# Patient Record
Sex: FEMALE | Race: WHITE | NOT HISPANIC OR LATINO | Employment: PART TIME | URBAN - METROPOLITAN AREA
[De-identification: names, ages, dates, MRNs, and addresses within clinical notes are randomized per-mention and may not be internally consistent; named-entity substitution may affect disease eponyms.]

---

## 2017-01-09 ENCOUNTER — OFFICE VISIT (OUTPATIENT)
Dept: NEUROSURGERY | Facility: CLINIC | Age: 72
End: 2017-01-09
Payer: MEDICARE

## 2017-01-09 VITALS
SYSTOLIC BLOOD PRESSURE: 119 MMHG | DIASTOLIC BLOOD PRESSURE: 75 MMHG | HEIGHT: 66 IN | WEIGHT: 192 LBS | HEART RATE: 75 BPM | BODY MASS INDEX: 30.86 KG/M2

## 2017-01-09 DIAGNOSIS — M40.202 KYPHOSIS OF CERVICAL REGION, UNSPECIFIED KYPHOSIS TYPE: Primary | ICD-10-CM

## 2017-01-09 PROCEDURE — 1159F MED LIST DOCD IN RCRD: CPT | Mod: S$GLB,,, | Performed by: NEUROLOGICAL SURGERY

## 2017-01-09 PROCEDURE — 99213 OFFICE O/P EST LOW 20 MIN: CPT | Mod: S$GLB,,, | Performed by: NEUROLOGICAL SURGERY

## 2017-01-09 PROCEDURE — 99999 PR PBB SHADOW E&M-EST. PATIENT-LVL III: CPT | Mod: PBBFAC,,, | Performed by: NEUROLOGICAL SURGERY

## 2017-01-09 PROCEDURE — 1157F ADVNC CARE PLAN IN RCRD: CPT | Mod: S$GLB,,, | Performed by: NEUROLOGICAL SURGERY

## 2017-01-09 PROCEDURE — 1160F RVW MEDS BY RX/DR IN RCRD: CPT | Mod: S$GLB,,, | Performed by: NEUROLOGICAL SURGERY

## 2017-01-09 NOTE — PROGRESS NOTES
This office note has been dictated.  January 9, 2017    Lázaro Vang M.D.  [address], [address]  [place] [address].    RE:  BASSEMIER, CORINNE  [facility] No.:  [id number]    Dear Dr. Vang:    Corinne Bassemier was seen in neurosurgical followup at the office today.  She   has noted for the last couple of months that when she gets up out of bed she has   pain at the base of her neck that radiates into her shoulders and along the   muscles of her neck.  When she is up and around, this dissipates and she is able   to function normally.  She continues to work three days a week.  She has no   pain radiating into the arms.  Her gait difficulties are about the same with leg   stiffness, which is helped by Zanaflex.    On brief examination today, she remains alert.  The cervical kyphosis seems   about the same.  The trapezius and sternocleidomastoid muscles are somewhat   prominent, but not really tender.  There is no tenderness over the brachial   plexus.  The previous cervical incision remains well healed.    We have not had spine films done for several years.  I will have a flexion,   extension and spine series done to see if there has been any change in alignment   or something else that might explain her new symptoms.  I will see her back   with her studies and we can plan from there.  Again, I appreciate working with   you in her care.    Sincerely yours,      SHAHANA  dd: 01/09/2017 12:02:00 (CST)  td: 01/10/2017 04:01:56 (CST)  Doc ID   #[id number]  Job ID #[id number]    CC: Corinne Bassemier

## 2017-01-09 NOTE — MR AVS SNAPSHOT
[facility] - Neurosurgery 7th Fl  [address]  [facility] [address]  Phone: [phone]                  Corinne L Bassemier   2017 11:00 AM   Office Visit    Description:  Female : 1945   Provider:  Mohamud Jordan MD   Department:  [facility] - Neurosurgery 7th Fl           Reason for Visit     Follow-up           Diagnoses this Visit        Comments    Kyphosis of cervical region, unspecified kyphosis type    -  Primary            To Do List           Goals (5 Years of Data)     None      Follow-Up and Disposition     Return in about 4 weeks (around 2017) for X-ray cervical spine.      [facility] On Call     [facility] On Call Nurse Care Line -  Assistance  Registered nurses in the [facility] On Call Center provide clinical advisement, health education, appointment booking, and other advisory services.  Call for this free service at [phone].             Medications           Message regarding Medications     Verify the changes and/or additions to your medication regime listed below are the same as discussed with your clinician today.  If any of these changes or additions are incorrect, please notify your healthcare provider.             Verify that the below list of medications is an accurate representation of the medications you are currently taking.  If none reported, the list may be blank. If incorrect, please contact your healthcare provider. Carry this list with you in case of emergency.           Current Medications     aspirin 325 MG tablet Take 325 mg by mouth once daily.    calcitonin, salmon, (FORTICAL) 200 unit/actuation nasal spray 1 spray by Nasal route once daily.    clonidine (CATAPRES) 0.1 MG tablet Take 0.1 mg by mouth 2 (two) times daily. Every day    dicyclomine (BENTYL) 10 MG capsule Take 10 mg by mouth 3 (three) times daily.    indapamide (LOZOL) 1.25 MG tablet Take 1.25 mg by mouth once daily. Every day    irbesartan (AVAPRO) 300 MG tablet Take 300 mg by mouth  "once daily. Every day    levothyroxine (SYNTHROID) 100 MCG tablet Take 100 mcg by mouth before breakfast. Every day    naproxen sodium (ANAPROX) 550 MG tablet Take 1 tablet (550 mg total) by mouth 2 (two) times daily with meals.    omeprazole (PRILOSEC) 20 MG capsule Take 20 mg by mouth once daily. Every day    simvastatin (ZOCOR) 20 MG tablet Take 20 mg by mouth every evening.     tamsulosin (FLOMAX) 0.4 mg Cp24 Take 1 capsule by mouth once daily.    tizanidine (ZANAFLEX) 2 MG tablet Take 1 tablet (2 mg total) by mouth 3 (three) times daily as needed.           Clinical Reference Information           Vital Signs - Last Recorded  Most recent update: 1/9/2017 10:58 AM by Aristides Carmen LPN    BP Pulse Ht Wt BMI    119/75 75 5' 6" (1.676 m) 87.1 kg (192 lb) 30.99 kg/m2      Blood Pressure          Most Recent Value    BP  119/75      Allergies as of 1/9/2017     Shellfish Containing Products      Immunizations Administered on Date of Encounter - 1/9/2017     None      Orders Placed During Today's Visit     Future Labs/Procedures Expected by Expires    X-Ray Cervical Spine 5 View With Flex And Ext  1/9/2017 1/9/2018      "

## 2017-01-26 DIAGNOSIS — C72.0 MALIGNANT NEOPLASM OF SPINAL CORD: ICD-10-CM

## 2017-01-26 DIAGNOSIS — M96.3 KYPHOSIS, POSTLAMINECTOMY: ICD-10-CM

## 2017-01-26 RX ORDER — TIZANIDINE 2 MG/1
TABLET ORAL
Qty: 60 TABLET | Refills: 1 | Status: SHIPPED | OUTPATIENT
Start: 2017-01-26 | End: 2017-03-09 | Stop reason: SDUPTHER

## 2017-01-30 ENCOUNTER — HOSPITAL ENCOUNTER (OUTPATIENT)
Dept: RADIOLOGY | Facility: HOSPITAL | Age: 72
Discharge: HOME | End: 2017-01-30
Attending: NEUROLOGICAL SURGERY
Payer: MEDICARE

## 2017-01-30 ENCOUNTER — OFFICE VISIT (OUTPATIENT)
Dept: NEUROSURGERY | Facility: CLINIC | Age: 72
End: 2017-01-30
Payer: MEDICARE

## 2017-01-30 VITALS
HEIGHT: 66 IN | SYSTOLIC BLOOD PRESSURE: 138 MMHG | HEART RATE: 71 BPM | BODY MASS INDEX: 30.86 KG/M2 | TEMPERATURE: 98 F | DIASTOLIC BLOOD PRESSURE: 75 MMHG | WEIGHT: 192 LBS

## 2017-01-30 DIAGNOSIS — M40.202 KYPHOSIS OF CERVICAL REGION, UNSPECIFIED KYPHOSIS TYPE: Primary | ICD-10-CM

## 2017-01-30 DIAGNOSIS — M40.202 KYPHOSIS OF CERVICAL REGION, UNSPECIFIED KYPHOSIS TYPE: ICD-10-CM

## 2017-01-30 PROCEDURE — 1126F AMNT PAIN NOTED NONE PRSNT: CPT | Mod: S$GLB,,, | Performed by: NEUROLOGICAL SURGERY

## 2017-01-30 PROCEDURE — 1157F ADVNC CARE PLAN IN RCRD: CPT | Mod: S$GLB,,, | Performed by: NEUROLOGICAL SURGERY

## 2017-01-30 PROCEDURE — 1160F RVW MEDS BY RX/DR IN RCRD: CPT | Mod: S$GLB,,, | Performed by: NEUROLOGICAL SURGERY

## 2017-01-30 PROCEDURE — 99212 OFFICE O/P EST SF 10 MIN: CPT | Mod: S$GLB,,, | Performed by: NEUROLOGICAL SURGERY

## 2017-01-30 PROCEDURE — 99999 PR PBB SHADOW E&M-EST. PATIENT-LVL III: CPT | Mod: PBBFAC,,, | Performed by: NEUROLOGICAL SURGERY

## 2017-01-30 PROCEDURE — 1159F MED LIST DOCD IN RCRD: CPT | Mod: S$GLB,,, | Performed by: NEUROLOGICAL SURGERY

## 2017-01-30 PROCEDURE — 72052 X-RAY EXAM NECK SPINE 6/>VWS: CPT | Mod: 26,,, | Performed by: RADIOLOGY

## 2017-01-30 NOTE — MR AVS SNAPSHOT
[facility] - Neurosurgery 7th Fl  [address]  [facility] [address]  Phone: [phone]                  Corinne L Bassemier   2017 2:30 PM   Office Visit    Description:  Female : 1945   Provider:  Mohamud Jordan MD   Department:  [facility] - Neurosurgery 7th Fl           Reason for Visit     Follow-up           Diagnoses this Visit        Comments    Kyphosis of cervical region, unspecified kyphosis type    -  Primary            To Do List           Goals (5 Years of Data)     None      Follow-Up and Disposition     Return in about 6 months (around 2017) for reevaluation.      [facility] On Call     [facility] On Call Nurse Care Line -  Assistance  Registered nurses in the [facility] On Call Center provide clinical advisement, health education, appointment booking, and other advisory services.  Call for this free service at [phone].             Medications           Message regarding Medications     Verify the changes and/or additions to your medication regime listed below are the same as discussed with your clinician today.  If any of these changes or additions are incorrect, please notify your healthcare provider.             Verify that the below list of medications is an accurate representation of the medications you are currently taking.  If none reported, the list may be blank. If incorrect, please contact your healthcare provider. Carry this list with you in case of emergency.           Current Medications     aspirin 325 MG tablet Take 325 mg by mouth once daily.    calcitonin, salmon, (FORTICAL) 200 unit/actuation nasal spray 1 spray by Nasal route once daily.    clonidine (CATAPRES) 0.1 MG tablet Take 0.1 mg by mouth 2 (two) times daily. Every day    dicyclomine (BENTYL) 10 MG capsule Take 10 mg by mouth 3 (three) times daily.    indapamide (LOZOL) 1.25 MG tablet Take 1.25 mg by mouth once daily. Every day    irbesartan (AVAPRO) 300 MG tablet Take 300 mg by mouth once  "daily. Every day    levothyroxine (SYNTHROID) 100 MCG tablet Take 100 mcg by mouth before breakfast. Every day    naproxen sodium (ANAPROX) 550 MG tablet Take 1 tablet (550 mg total) by mouth 2 (two) times daily with meals.    omeprazole (PRILOSEC) 20 MG capsule Take 20 mg by mouth once daily. Every day    simvastatin (ZOCOR) 20 MG tablet Take 20 mg by mouth every evening.     tamsulosin (FLOMAX) 0.4 mg Cp24 Take 1 capsule by mouth once daily.    tizanidine (ZANAFLEX) 2 MG tablet take 1 tablet by mouth three times a day if needed           Clinical Reference Information           Vital Signs - Last Recorded  Most recent update: 1/30/2017  1:53 PM by Zain Noriega MA    BP Pulse Temp Ht Wt BMI    138/75 71 98.1 °F (36.7 °C) 5' 6" (1.676 m) 87.1 kg (192 lb) 30.99 kg/m2      Blood Pressure          Most Recent Value    BP  138/75      Allergies as of 1/30/2017     Shellfish Containing Products      Immunizations Administered on Date of Encounter - 1/30/2017     None      "

## 2017-01-30 NOTE — PROGRESS NOTES
This office note has been dictated.  January 30, 2017    Lázaro Vang M.D.  [address], [address]  [place]  [address]     RE:  BASSEMIER, CORINNE  [facility] No.:  [id number]    Dear Dr. Vang:    Corinne Bassemier was seen in neurosurgical followup at the office this   afternoon.  She continues to complain of pain and discomfort primarily on the   left side of her neck and up on to her scalp.  She has a hard time getting out   of bed in the morning.  When she is up, she does better.  Pain has been   manageable.    I did not reexamine her today.  A cervical spine series was done at [facility] today.  This was compared to the older cervical spine series I have at   [facility] of 01/05/09.  The anterior plate and screw instrumentation from C3-C5   remains intact.  There has been no loosening of the screws.  There has been,   however, some progression of anterolisthesis of C2 on C3, which was about 2 mm   and is now about 5 mm.  This does not reduce in extension.  This results in mild   increase in the cervical kyphosis.    I do not believe that the anterolisthesis itself is the pain generator, but   demonstrates increased tension on her muscles and ligaments in this area.  The   cervical kyphosis could be corrected with a posterior cervical fusion, but this   would be a major operation for her.  At this point, she feels that her pain is   manageable.  We will try having her sleep in a foam collar at night and see if   this helps.  I will have her return in about six months in followup and again   appreciate being allowed to participate in her care.    Sincerely yours,            MAYKEL/SHARI  dd: 01/30/2017 14:37:36 (CST)  td: 01/31/2017 04:45:37 (CST)  Doc ID   #[id number]  Job ID #[id number]    CC: Corinne Bassemier

## 2017-03-09 DIAGNOSIS — C72.0 MALIGNANT NEOPLASM OF SPINAL CORD: ICD-10-CM

## 2017-03-09 DIAGNOSIS — M96.3 KYPHOSIS, POSTLAMINECTOMY: ICD-10-CM

## 2017-03-09 RX ORDER — TIZANIDINE 2 MG/1
TABLET ORAL
Qty: 60 TABLET | Refills: 1 | Status: SHIPPED | OUTPATIENT
Start: 2017-03-09 | End: 2017-05-06 | Stop reason: SDUPTHER

## 2017-05-06 DIAGNOSIS — M96.3 KYPHOSIS, POSTLAMINECTOMY: ICD-10-CM

## 2017-05-06 DIAGNOSIS — C72.0 MALIGNANT NEOPLASM OF SPINAL CORD: ICD-10-CM

## 2017-05-06 RX ORDER — TIZANIDINE 2 MG/1
TABLET ORAL
Qty: 60 TABLET | Refills: 1 | Status: SHIPPED | OUTPATIENT
Start: 2017-05-06 | End: 2017-06-24 | Stop reason: SDUPTHER

## 2017-06-24 DIAGNOSIS — M96.3 KYPHOSIS, POSTLAMINECTOMY: ICD-10-CM

## 2017-06-24 DIAGNOSIS — C72.0 MALIGNANT NEOPLASM OF SPINAL CORD: ICD-10-CM

## 2017-06-24 RX ORDER — TIZANIDINE 2 MG/1
TABLET ORAL
Qty: 60 TABLET | Refills: 1 | Status: SHIPPED | OUTPATIENT
Start: 2017-06-24 | End: 2017-08-18 | Stop reason: SDUPTHER

## 2017-07-24 ENCOUNTER — OFFICE VISIT (OUTPATIENT)
Dept: NEUROSURGERY | Facility: CLINIC | Age: 72
End: 2017-07-24
Payer: MEDICARE

## 2017-07-24 VITALS
RESPIRATION RATE: 13 BRPM | HEART RATE: 58 BPM | DIASTOLIC BLOOD PRESSURE: 67 MMHG | WEIGHT: 199 LBS | TEMPERATURE: 98 F | HEIGHT: 66 IN | BODY MASS INDEX: 31.98 KG/M2 | SYSTOLIC BLOOD PRESSURE: 111 MMHG

## 2017-07-24 DIAGNOSIS — M96.3 KYPHOSIS, POSTLAMINECTOMY: Primary | ICD-10-CM

## 2017-07-24 PROCEDURE — 1159F MED LIST DOCD IN RCRD: CPT | Mod: S$GLB,,, | Performed by: NEUROLOGICAL SURGERY

## 2017-07-24 PROCEDURE — 99213 OFFICE O/P EST LOW 20 MIN: CPT | Mod: S$GLB,,, | Performed by: NEUROLOGICAL SURGERY

## 2017-07-24 PROCEDURE — 99999 PR PBB SHADOW E&M-EST. PATIENT-LVL III: CPT | Mod: PBBFAC,,, | Performed by: NEUROLOGICAL SURGERY

## 2017-07-24 PROCEDURE — 1126F AMNT PAIN NOTED NONE PRSNT: CPT | Mod: S$GLB,,, | Performed by: NEUROLOGICAL SURGERY

## 2017-07-24 NOTE — PROGRESS NOTES
This office note has been dictated.  July 24, 2017    Lázaro Lamb M.D.  [address], [address]  [place] [address]    RE:  BASSEMIER, CORINNE  [facility] No:  [id number]    Dear Dr. Songy Corinne Bassemier returned to neurosurgical followup to the office this morning.    The cervical pain that was bothering her earlier this year has settled down   and does not seem to be a major problem at this point.  She was found to have   some enlarged periaortic lymph nodes by CT scan and these are being followed.    She feels her gait is about the same.  She generally uses a cane.  She continues   to work three days a week.  Extremity stiffness is about the same and seems to   be well managed with Zanaflex.    On brief examination today, she is alert and cooperative.  She continues to show   cervical kyphosis.  She can partially extend the neck.  She shows some bruising   on her hands.  She says light trauma will result in some bruising.  She takes   aspirin, but no other anticoagulant.  Gait is unchanged.    I am happy she is doing better with respect to her cervical pain.  Attempt to   surgically correct the longstanding kyphosis would be a major undertaking.  Her   Zanaflex was recently refilled.  I will plan to see her back in about six months   in routine followup.    Thank you again for the opportunity to work with you in her care.    Sincerely,      MAYKEL/IN  dd: 07/24/2017 10:49:42 (CDT)  td: 07/24/2017 23:49:00 (CDT)  Doc ID   #[id number]  Job ID #[id number]    CC: Steff LAMB M.D.

## 2017-07-24 NOTE — LETTER
July 24, 2017      Lázaro Vang MD  [address]   [address]  [place] [address]           [facility] - Neurosurgery 7th Fl  [address]  [place] [address]  Phone: [phone]          Patient: Corinne L Bassemier   MR Number: [id number]   YOB: 1945   Date of Visit: 7/24/2017       Dear Dr. Lázaro Vang:    Thank you for referring Corinne Bassemier to me for evaluation. Attached you will find relevant portions of my assessment and plan of care.    If you have questions, please do not hesitate to call me. I look forward to following Corinne Bassemier along with you.    Sincerely,    Mohamud Jordan MD    Enclosure  CC:  No Recipients    If you would like to receive this communication electronically, please contact [email or web address] or [phone] to request more information on [email or web address] Link access.    For providers and/or their staff who would like to refer a patient to [facility], please contact us through our one-stop-shop provider referral line, [facility], at [phone].    If you feel you have received this communication in error or would no longer like to receive these types of communications, please e-mail [email or web address]

## 2017-08-18 DIAGNOSIS — M96.3 KYPHOSIS, POSTLAMINECTOMY: ICD-10-CM

## 2017-08-18 DIAGNOSIS — C72.0 MALIGNANT NEOPLASM OF SPINAL CORD: ICD-10-CM

## 2017-08-21 RX ORDER — TIZANIDINE 2 MG/1
TABLET ORAL
Qty: 60 TABLET | Refills: 1 | Status: SHIPPED | OUTPATIENT
Start: 2017-08-21 | End: 2017-11-09 | Stop reason: SDUPTHER

## 2017-11-09 DIAGNOSIS — M96.3 KYPHOSIS, POSTLAMINECTOMY: ICD-10-CM

## 2017-11-09 DIAGNOSIS — C72.0 MALIGNANT NEOPLASM OF SPINAL CORD: ICD-10-CM

## 2017-11-09 RX ORDER — TIZANIDINE 2 MG/1
TABLET ORAL
Qty: 60 TABLET | Refills: 1 | Status: SHIPPED | OUTPATIENT
Start: 2017-11-09 | End: 2017-12-29 | Stop reason: SDUPTHER

## 2017-12-29 DIAGNOSIS — C72.0 MALIGNANT NEOPLASM OF SPINAL CORD: ICD-10-CM

## 2017-12-29 DIAGNOSIS — M96.3 KYPHOSIS, POSTLAMINECTOMY: ICD-10-CM

## 2017-12-29 RX ORDER — TIZANIDINE 2 MG/1
TABLET ORAL
Qty: 60 TABLET | Refills: 1 | Status: SHIPPED | OUTPATIENT
Start: 2017-12-29 | End: 2018-02-17 | Stop reason: SDUPTHER

## 2018-01-03 ENCOUNTER — TELEPHONE (OUTPATIENT)
Dept: NEUROSURGERY | Facility: CLINIC | Age: 73
End: 2018-01-03

## 2018-01-03 NOTE — TELEPHONE ENCOUNTER
----- Message from Cayden Hernandez sent at 1/3/2018  8:22 AM CST -----  Contact: Pt. [phone]  The patient would like to speak to someone regarding scheduling her appointment. She would prefer Monday January 29th. Please contact the patient to discuss further.

## 2018-01-29 ENCOUNTER — OFFICE VISIT (OUTPATIENT)
Dept: NEUROSURGERY | Facility: CLINIC | Age: 73
End: 2018-01-29
Payer: MEDICARE

## 2018-01-29 VITALS
WEIGHT: 199 LBS | HEIGHT: 66 IN | HEART RATE: 72 BPM | TEMPERATURE: 97 F | SYSTOLIC BLOOD PRESSURE: 99 MMHG | DIASTOLIC BLOOD PRESSURE: 61 MMHG | BODY MASS INDEX: 31.98 KG/M2

## 2018-01-29 DIAGNOSIS — M40.292 OTHER KYPHOSIS, CERVICAL REGION: Primary | ICD-10-CM

## 2018-01-29 PROCEDURE — 99999 PR PBB SHADOW E&M-EST. PATIENT-LVL III: CPT | Mod: PBBFAC,,, | Performed by: NEUROLOGICAL SURGERY

## 2018-01-29 PROCEDURE — 99213 OFFICE O/P EST LOW 20 MIN: CPT | Mod: S$GLB,,, | Performed by: NEUROLOGICAL SURGERY

## 2018-01-29 NOTE — PROGRESS NOTES
This office note has been dictated.  January 19, 2018    Lázaro Lamb M.D.  [address], [address]  [place] [address]    RE:  BASSEMIER, CORINNE  [facility] No.:  [id number]    Dear Dr. Lamb:    Corinne Bassemier was seen in neurosurgical followup at the office today.  She   has been doing fairly well over the last six months.  She did have increased   pain in her right knee and has had a recent steroid injection, which seems to   have helped.  She has had some flare-up of her sciatica also which she   attributes to limping because of the knee.  This also seems somewhat improved   after her steroid injection.  Her rigidity continues to be helped with Zanaflex   and she has had a recent refill of the medication.    On examination today, she is speaking clearly and provides a good history.  Her   cervical kyphosis is about the same.  She again notes some bruising on her hands   with minimal trauma.  She is using a cane with gait, but is not ataxic.    Overall, she is doing reasonably well.  She continues to work part-time.  Her    has lost weight and seems to be doing better also.    I have no specific recommendations for her.  We can continue to refill her   Zanaflex as needed.  I will plan to see her back in about six months in followup   and again appreciate working with you in her care.    Sincerely yours,          MAYKEL/SHARI  dd: 01/29/2018 11:05:40 (CST)  td: 01/30/2018 06:57:40 (CST)  Doc ID   #[id number]  Job ID #[id number]    CC: Corinne Bassemier ROBERT SONGY M.D.

## 2018-02-17 DIAGNOSIS — C72.0 MALIGNANT NEOPLASM OF SPINAL CORD: ICD-10-CM

## 2018-02-17 DIAGNOSIS — M96.3 KYPHOSIS, POSTLAMINECTOMY: ICD-10-CM

## 2018-02-19 RX ORDER — TIZANIDINE 2 MG/1
TABLET ORAL
Qty: 60 TABLET | Refills: 1 | Status: SHIPPED | OUTPATIENT
Start: 2018-02-19 | End: 2018-06-16 | Stop reason: SDUPTHER

## 2018-06-16 DIAGNOSIS — C72.0 MALIGNANT NEOPLASM OF SPINAL CORD: ICD-10-CM

## 2018-06-16 DIAGNOSIS — M96.3 KYPHOSIS, POSTLAMINECTOMY: ICD-10-CM

## 2018-06-18 RX ORDER — TIZANIDINE 2 MG/1
TABLET ORAL
Qty: 60 TABLET | Refills: 1 | Status: SHIPPED | OUTPATIENT
Start: 2018-06-18 | End: 2018-08-25 | Stop reason: SDUPTHER

## 2018-08-06 ENCOUNTER — OFFICE VISIT (OUTPATIENT)
Dept: NEUROSURGERY | Facility: CLINIC | Age: 73
End: 2018-08-06
Payer: MEDICARE

## 2018-08-06 VITALS
WEIGHT: 193.63 LBS | SYSTOLIC BLOOD PRESSURE: 141 MMHG | TEMPERATURE: 98 F | BODY MASS INDEX: 31.25 KG/M2 | HEART RATE: 67 BPM | DIASTOLIC BLOOD PRESSURE: 81 MMHG

## 2018-08-06 DIAGNOSIS — M40.12 OTHER SECONDARY KYPHOSIS, CERVICAL REGION: Primary | ICD-10-CM

## 2018-08-06 PROCEDURE — 99999 PR PBB SHADOW E&M-EST. PATIENT-LVL III: CPT | Mod: PBBFAC,,, | Performed by: NEUROLOGICAL SURGERY

## 2018-08-06 PROCEDURE — 99212 OFFICE O/P EST SF 10 MIN: CPT | Mod: S$GLB,,, | Performed by: NEUROLOGICAL SURGERY

## 2018-08-06 RX ORDER — CYANOCOBALAMIN 1000 UG/ML
INJECTION, SOLUTION INTRAMUSCULAR; SUBCUTANEOUS
COMMUNITY
Start: 2018-07-02

## 2018-08-06 NOTE — LETTER
August 6, 2018      Lázaro Vang MD  [address]   [address]  [place] [address]           [facility] - Neurosurgery 7th Fl  [address]  [place] [address]  Phone: [phone]          Patient: Corinne L Bassemier   MR Number: [id number]   YOB: 1945   Date of Visit: 8/6/2018       Dear Dr. Lázaro Vang:    Thank you for referring Corinne Bassemier to me for evaluation. Attached you will find relevant portions of my assessment and plan of care.    If you have questions, please do not hesitate to call me. I look forward to following Corinne Bassemier along with you.    Sincerely,    Mohamud Jordan MD    Enclosure  CC:  No Recipients    If you would like to receive this communication electronically, please contact [email or web address] or [phone] to request more information on [employer] Link access.    For providers and/or their staff who would like to refer a patient to [facility], please contact us through our one-stop-shop provider referral line, [facility] [place], at [phone].    If you feel you have received this communication in error or would no longer like to receive these types of communications, please e-mail [email or web address]

## 2018-08-06 NOTE — PROGRESS NOTES
This office note has been dictated.  August 6, 2018    Lázaro Lamb M.D.  [address], [address]  [place]  [address]    RE:  BASSEMIER, CORINNE  [facility] No.:  [id number]    Dear Dr. Lamb:    Corinne Bassemier returned in neurosurgical followup to the office this morning.    Overall, she has been doing reasonably well over the last six months.  She was   prescribed a tape support for her right knee and this has helped her with knee   pain and she has not required additional injections into the knee.  Low back   pain remains about the same.  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  PET scan is being used to   monitor two lymph nodes following the resection of her cancer.    On brief examination, she is alert and oriented.  The cervical kyphosis seems   fairly well compensated and she has no tenderness over the neck.  Her gait   remains about the same.    She can call when she is due for refill of Zanaflex.  I will plan to see her   back in about six months in routine followup and again appreciate being allowed   to participate in her care.    Sincerely yours,            MAYKEL/SHARI  dd: 08/06/2018 10:48:14 (CDT)  td: 08/06/2018 16:41:51 (CDT)  Doc ID   #[id number]  Job ID #[id number]    CC: Corinne Bassemier ROBERT SONGY M.D.

## 2018-08-25 DIAGNOSIS — C72.0 MALIGNANT NEOPLASM OF SPINAL CORD: ICD-10-CM

## 2018-08-25 DIAGNOSIS — M96.3 KYPHOSIS, POSTLAMINECTOMY: ICD-10-CM

## 2018-08-27 RX ORDER — TIZANIDINE 2 MG/1
TABLET ORAL
Qty: 60 TABLET | Refills: 0 | Status: SHIPPED | OUTPATIENT
Start: 2018-08-27 | End: 2018-09-29 | Stop reason: SDUPTHER

## 2018-09-29 DIAGNOSIS — M96.3 KYPHOSIS, POSTLAMINECTOMY: ICD-10-CM

## 2018-09-29 DIAGNOSIS — C72.0 MALIGNANT NEOPLASM OF SPINAL CORD: ICD-10-CM

## 2018-09-29 RX ORDER — TIZANIDINE 2 MG/1
TABLET ORAL
Qty: 60 TABLET | Refills: 0 | Status: SHIPPED | OUTPATIENT
Start: 2018-09-29 | End: 2018-11-02 | Stop reason: SDUPTHER

## 2018-11-02 DIAGNOSIS — C72.0 MALIGNANT NEOPLASM OF SPINAL CORD: ICD-10-CM

## 2018-11-02 DIAGNOSIS — M96.3 KYPHOSIS, POSTLAMINECTOMY: ICD-10-CM

## 2018-11-02 RX ORDER — TIZANIDINE 2 MG/1
TABLET ORAL
Qty: 60 TABLET | Refills: 0 | Status: SHIPPED | OUTPATIENT
Start: 2018-11-02 | End: 2018-12-01 | Stop reason: SDUPTHER

## 2018-11-05 ENCOUNTER — TELEPHONE (OUTPATIENT)
Dept: NEUROSURGERY | Facility: CLINIC | Age: 73
End: 2018-11-05

## 2018-11-05 NOTE — TELEPHONE ENCOUNTER
----- Message from Christiano Scales sent at 11/5/2018  8:19 AM CST -----  Needs Advice    Reason for call: Pt states she needs to see Dr. Jordan sooner than the recall date 02/04/19 due to numbness in hands and legs hurting (pt believes it's coming from her neck). I was not sure if the pt would need imaging because of this.        Communication Preference: [phone]    Additional Information:

## 2018-11-12 ENCOUNTER — OFFICE VISIT (OUTPATIENT)
Dept: NEUROSURGERY | Facility: CLINIC | Age: 73
End: 2018-11-12
Payer: MEDICARE

## 2018-11-12 VITALS
BODY MASS INDEX: 31.18 KG/M2 | TEMPERATURE: 98 F | DIASTOLIC BLOOD PRESSURE: 85 MMHG | HEART RATE: 74 BPM | WEIGHT: 193.19 LBS | SYSTOLIC BLOOD PRESSURE: 149 MMHG

## 2018-11-12 DIAGNOSIS — M40.12 OTHER SECONDARY KYPHOSIS, CERVICAL REGION: ICD-10-CM

## 2018-11-12 DIAGNOSIS — G95.9 CERVICAL MYELOPATHY: Primary | ICD-10-CM

## 2018-11-12 PROCEDURE — 99214 OFFICE O/P EST MOD 30 MIN: CPT | Mod: S$GLB,,, | Performed by: NEUROLOGICAL SURGERY

## 2018-11-12 PROCEDURE — 1101F PT FALLS ASSESS-DOCD LE1/YR: CPT | Mod: CPTII,S$GLB,, | Performed by: NEUROLOGICAL SURGERY

## 2018-11-12 PROCEDURE — 99999 PR PBB SHADOW E&M-EST. PATIENT-LVL III: CPT | Mod: PBBFAC,,, | Performed by: NEUROLOGICAL SURGERY

## 2018-11-12 NOTE — PROGRESS NOTES
This office note has been dictated.  November 12, 2018    Lázaro Vang M.D.  [address], [address]  [place] [address]    RE:  BASSEMIER, CORINNE  [facility] No.:  [id number]    Dear Dr. Vang:     Corinne Bassemier was seen in neurosurgical followup at the office this morning.    When I last saw her on 08/06/18, she was about the same.  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  However, over the last few months, she has had   increasing difficulties.  She is having more problems keeping her head up.  Her   hands have felt numb to her.  She drops things sometimes.  She feels her gait   is becoming less steady.    On examination today, she remains alert and oriented.  There seems to be some   increase in kyphosis with her chin closer to her sternum and prominence of the   C7 spinous process.  The posterior incision itself remains well healed.    Strength in deltoid and biceps is reasonably good.  Triceps perhaps somewhat   weaker, better on the left.  Finger extension and intrinsic hand function seem   strong today.  She had difficulty; however, getting up onto the examining table   and has more of a waddling gait.  She has had lumbar surgery as you know, but   this seems perhaps more related to spinal cord dysfunction.  Sensation feels   numb to touch, but is intact to pinprick.  Deep tendon reflexes are 1+ upper   extremities and 2+ patella.    She seems to have had some progression of cervical kyphosis and myelopathy.  I   will have MRI of the cervical spine and cervical spine x-ray series with flexion   and extension done and see her back.  I will let you know how she is doing on   her next visit, and again appreciate working with you in her care.    Sincerely yours,            MAYKEL/SHARI  dd: 11/12/2018 11:29:09 (CST)  td: 11/13/2018 06:40:19 (CST)  Doc ID   #[id number]  Job ID #[id number]    CC: Corinne Bassemier    ZULEIKA LAMB M.D.

## 2018-12-01 DIAGNOSIS — C72.0 MALIGNANT NEOPLASM OF SPINAL CORD: ICD-10-CM

## 2018-12-01 DIAGNOSIS — M96.3 KYPHOSIS, POSTLAMINECTOMY: ICD-10-CM

## 2018-12-03 ENCOUNTER — HOSPITAL ENCOUNTER (OUTPATIENT)
Dept: RADIOLOGY | Facility: HOSPITAL | Age: 73
Discharge: HOME | End: 2018-12-03
Attending: NEUROLOGICAL SURGERY
Payer: MEDICARE

## 2018-12-03 ENCOUNTER — OFFICE VISIT (OUTPATIENT)
Dept: NEUROSURGERY | Facility: CLINIC | Age: 73
End: 2018-12-03
Payer: MEDICARE

## 2018-12-03 VITALS
BODY MASS INDEX: 31.12 KG/M2 | TEMPERATURE: 98 F | SYSTOLIC BLOOD PRESSURE: 122 MMHG | HEIGHT: 66 IN | WEIGHT: 193.63 LBS | DIASTOLIC BLOOD PRESSURE: 57 MMHG

## 2018-12-03 DIAGNOSIS — G99.2 MYELOPATHY CONCURRENT WITH AND DUE TO SPINAL STENOSIS OF CERVICAL REGION: ICD-10-CM

## 2018-12-03 DIAGNOSIS — M40.12 OTHER SECONDARY KYPHOSIS, CERVICAL REGION: Primary | ICD-10-CM

## 2018-12-03 DIAGNOSIS — M40.12 OTHER SECONDARY KYPHOSIS, CERVICAL REGION: ICD-10-CM

## 2018-12-03 DIAGNOSIS — M48.02 MYELOPATHY CONCURRENT WITH AND DUE TO SPINAL STENOSIS OF CERVICAL REGION: ICD-10-CM

## 2018-12-03 DIAGNOSIS — G95.9 CERVICAL MYELOPATHY: ICD-10-CM

## 2018-12-03 LAB
CREAT SERPL-MCNC: 1.2 MG/DL (ref 0.5–1.4)
SAMPLE: NORMAL

## 2018-12-03 PROCEDURE — 1101F PT FALLS ASSESS-DOCD LE1/YR: CPT | Mod: CPTII,S$GLB,, | Performed by: NEUROLOGICAL SURGERY

## 2018-12-03 PROCEDURE — A9585 GADOBUTROL INJECTION: HCPCS | Performed by: NEUROLOGICAL SURGERY

## 2018-12-03 PROCEDURE — 72156 MRI NECK SPINE W/O & W/DYE: CPT | Mod: 26,,, | Performed by: RADIOLOGY

## 2018-12-03 PROCEDURE — 25500020 PHARM REV CODE 255: Performed by: NEUROLOGICAL SURGERY

## 2018-12-03 PROCEDURE — 99213 OFFICE O/P EST LOW 20 MIN: CPT | Mod: S$GLB,,, | Performed by: NEUROLOGICAL SURGERY

## 2018-12-03 PROCEDURE — 72052 X-RAY EXAM NECK SPINE 6/>VWS: CPT | Mod: 26,,, | Performed by: RADIOLOGY

## 2018-12-03 PROCEDURE — 72052 X-RAY EXAM NECK SPINE 6/>VWS: CPT | Mod: TC

## 2018-12-03 PROCEDURE — 72156 MRI NECK SPINE W/O & W/DYE: CPT | Mod: TC

## 2018-12-03 PROCEDURE — 99999 PR PBB SHADOW E&M-EST. PATIENT-LVL III: CPT | Mod: PBBFAC,,, | Performed by: NEUROLOGICAL SURGERY

## 2018-12-03 RX ORDER — GADOBUTROL 604.72 MG/ML
10 INJECTION INTRAVENOUS
Status: COMPLETED | OUTPATIENT
Start: 2018-12-03 | End: 2018-12-03

## 2018-12-03 RX ORDER — TIZANIDINE 2 MG/1
TABLET ORAL
Qty: 60 TABLET | Refills: 0 | Status: SHIPPED | OUTPATIENT
Start: 2018-12-03 | End: 2019-01-05 | Stop reason: SDUPTHER

## 2018-12-03 RX ADMIN — GADOBUTROL 10 ML: 604.72 INJECTION INTRAVENOUS at 08:12

## 2018-12-03 NOTE — PROGRESS NOTES
This office note has been dictated.  December 03, 2018    Lázaro Lamb M.D.  [address], [address]  [place] [address]    RE:  BASSEMIER, CORINNE L.  [facility] No.:  [id number]    Dear Dr. Lamb:    Corinne Bassemier was seen in neurosurgical followup at the office today.  She   has continued to note paresthesias in her hands and increased difficulty with   walking.  She has to use the cane almost always now.    Cervical spine films and MRI of the cervical spine were done at [facility]   this morning.  Plain x-rays show increased spondylolisthesis at C2-C3 compared   to her older studies.  This is a level just above her anterior cervical fusion.    MRI shows the spondylolisthesis.  There is some circumferential disk herniation   and hypertrophy of the ligamentum flavum posteriorly resulting in a cervical   cord compression and severe spinal stenosis.    The spinal cord compression has now become symptomatic and she will need to be   treated.  I believe this will require a posterior cervical fusion, perhaps to   include the occiput with a laminectomy at the C2 level.  I will refer her to our   deformity spinal surgeon, Dr. Banuelos.  This was discussed with the patient and   her .  I will plan to have her see you back in preoperative evaluation,   she would like to get done sometime in January 2019.    Thank you again for the opportunity to work with you in her care.    Sincerely yours,      MAYKEL/SHARI  dd: 12/03/2018 11:49:26 (CST)  td: 12/04/2018 07:13:07 (CST)  Doc ID   #[id number]  Job ID #[id number]    CC: Anna LAMB M.D.

## 2019-01-05 DIAGNOSIS — M96.3 KYPHOSIS, POSTLAMINECTOMY: ICD-10-CM

## 2019-01-05 DIAGNOSIS — C72.0 MALIGNANT NEOPLASM OF SPINAL CORD: ICD-10-CM

## 2019-01-06 RX ORDER — TIZANIDINE 2 MG/1
TABLET ORAL
Qty: 60 TABLET | Refills: 0 | Status: SHIPPED | OUTPATIENT
Start: 2019-01-06 | End: 2019-02-01 | Stop reason: SDUPTHER

## 2019-01-10 ENCOUNTER — OFFICE VISIT (OUTPATIENT)
Dept: NEUROSURGERY | Facility: CLINIC | Age: 74
End: 2019-01-10
Payer: MEDICARE

## 2019-01-10 VITALS
BODY MASS INDEX: 31.02 KG/M2 | HEART RATE: 62 BPM | WEIGHT: 193 LBS | HEIGHT: 66 IN | SYSTOLIC BLOOD PRESSURE: 146 MMHG | TEMPERATURE: 98 F | DIASTOLIC BLOOD PRESSURE: 65 MMHG

## 2019-01-10 DIAGNOSIS — M40.202 KYPHOSIS OF CERVICAL REGION, UNSPECIFIED KYPHOSIS TYPE: ICD-10-CM

## 2019-01-10 DIAGNOSIS — M53.2X2 CERVICAL SPINE INSTABILITY: ICD-10-CM

## 2019-01-10 DIAGNOSIS — G95.9 CERVICAL MYELOPATHY: Primary | ICD-10-CM

## 2019-01-10 PROCEDURE — 99999 PR PBB SHADOW E&M-EST. PATIENT-LVL IV: ICD-10-PCS | Mod: PBBFAC,,, | Performed by: NEUROLOGICAL SURGERY

## 2019-01-10 PROCEDURE — 99999 PR PBB SHADOW E&M-EST. PATIENT-LVL IV: CPT | Mod: PBBFAC,,, | Performed by: NEUROLOGICAL SURGERY

## 2019-01-10 PROCEDURE — 1101F PT FALLS ASSESS-DOCD LE1/YR: CPT | Mod: CPTII,S$GLB,, | Performed by: NEUROLOGICAL SURGERY

## 2019-01-10 PROCEDURE — 99215 PR OFFICE/OUTPT VISIT, EST, LEVL V, 40-54 MIN: ICD-10-PCS | Mod: S$GLB,,, | Performed by: NEUROLOGICAL SURGERY

## 2019-01-10 PROCEDURE — 1101F PR PT FALLS ASSESS DOC 0-1 FALLS W/OUT INJ PAST YR: ICD-10-PCS | Mod: CPTII,S$GLB,, | Performed by: NEUROLOGICAL SURGERY

## 2019-01-10 PROCEDURE — 99215 OFFICE O/P EST HI 40 MIN: CPT | Mod: S$GLB,,, | Performed by: NEUROLOGICAL SURGERY

## 2019-01-10 NOTE — PROGRESS NOTES
Dear Dr. Jordan,       Thank you for referring this patient to my clinic. The full details of my evaluation will also be forthcoming to you by letter.    CHIEF COMPLAINT:  Consult     I, Ez Garcia, attest that this documentation has been prepared under the direction and in the presence of Yoel Banuelos MD.    HPI:  Corinne L Bassemier is a 73 y.o.  female with history of a spinal cord tumor and Paget's disease, who is referred to me by Dr. Jordan for evaluation of bilateral hand paraesthesias and difficulty ambulating. Pt reports longstanding neck problems and several surgeries. 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 She received radiation therapy with no tumor recurrence. Pt then received and ACDF performed by Dr. Jordan in 2002. Pt had cancer in her pelvic region and groin in approximately 2004 and received radiation therapy and resection of lymph notes in her left pelvic region. Pt is currently having swollen lymph nodes followed that were last measured at 1.1 cm. Pt's Paget's disease is followed by a doctor at [facility]. She does not have an endocrinologist. Pt's cervical deformity began in 2002. It improved for a time after surgery but her head has begun falling forward again since 2-3 years ago. Beginning in September 2018, she noticed worsening hand clumsiness and difficulty with fine motor tasks such as buttons or writing. Pt also notes difficulty ambulating and has begun using a cane. She is unsteady on her feet. She denies any b/b dysfunction. Pt reports diffuse numbness since her surgery in 1990 but she has noticed worsening numbness in her bilateral hands. She also endures soreness in her bilaterally hands. She has not received a DXA bone scan recently, but states that her last scan showed osteopenia. Pt is currently on Fortical nasal spray.         Review of patient's allergies indicates:   Allergen Reactions    Shellfish containing products Hives      shrimp       Past Medical History:   Diagnosis Date    Allergy     shrimp    Arthritis     GERD (gastroesophageal reflux disease)     Hyperlipidemia     Hypertension     Neuromuscular disorder     secondary to cervical spinal cord tumor    Paget's disease (intraepithelial adenocarcinoma) of vulva 2008    Spinal cord tumor 1/1990    Thyroid disease     hypothyroidism on synthroid     Past Surgical History:   Procedure Laterality Date    CERVICAL SPINE SURGERY      LUMBAR LAMINECTOMY  11/11/2014    L3-4 decompression w/partial laminectomies    LYMPH NODE BIOPSY      lymph node surgery    pagets      pagets surgery    rectum      tear in rectum    SPINE SURGERY      TONSILLECTOMY       Family History   Problem Relation Age of Onset    Heart disease Mother     Diabetes Mother     Heart disease Father     Cancer Brother     Stroke Maternal Grandmother      Social History     Tobacco Use    Smoking status: Never Smoker    Smokeless tobacco: Never Used   Substance Use Topics    Alcohol use: Yes     Comment: maybe once a month    Drug use: No        Review of Systems   Constitutional: Negative.    HENT: Negative.    Eyes: Negative.    Respiratory: Negative.    Cardiovascular: Negative.    Gastrointestinal: Negative.    Endocrine: Negative.    Genitourinary: Negative.    Musculoskeletal: Positive for gait problem (Cane) and myalgias (bilateral hand soreness). Negative for back pain and neck pain.   Skin: Negative.    Allergic/Immunologic: Negative.    Neurological: Positive for numbness (BUE). Negative for weakness, light-headedness and headaches.        Hand clumsiness       Imbalance   Hematological: Negative.    Psychiatric/Behavioral: Negative.        OBJECTIVE:   Vital Signs:  Temp: 97.5 °F (36.4 °C) (01/10/19 0837)  Pulse: 62 (01/10/19 0837)  BP: (!) 146/65 (01/10/19 0837)    Physical Exam:    Vital signs: All nursing notes and vital signs reviewed -- afebrile, vital signs  stable.  Constitutional: Patient sitting comfortably in chair. Appears well developed and well nourished.  Skin: Exposed areas are intact without abnormal markings, rashes or other lesions. Well healed midline posterior cervical scar. Difficult to visualize but report of a left side approach anteriorly.   HEENT: Normocephalic. Normal conjunctivae.  Cardiovascular: Normal rate and regular rhythm.  Respiratory: Chest wall rises and falls symmetrically, without signs of respiratory distress.  Abdomen: Soft and non-tender.  Extremities: Warm and without edema. Calves supple, non-tender.  Psych/Behavior: Normal affect.    Neurological:    Mental status: Alert and oriented. Conversational and appropriate.       Cranial Nerves: Grossly intact.     Motor:    Upper:  Deltoids Triceps Biceps WE WF     R 5/5 5/5 5/5 5/5 5/5 5/5    L 5/5 5/5 5/5 5/5 5/5 5/5      Lower:  HF KE KF DF PF EHL    R 5/5 5/5 5/5 5/5 5/5 5/5    L 5/5 5/5 5/5 5/5 5/5 5/5     Sensory: Intact sensation to light touch in all extremities. Romberg negative.    Reflexes:          DTR: 2+ patellar bilaterally.     Smith's: Positive, right.      Babinski's: Negative.     Clonus: Negative.    Cerebellar: Finger-to-nose and rapid alternating movements normal. Slow mild ataxic gait, wide based.    Spine:    Posture: Head well aligned over pelvis in front and side views.  No focal or global spinal deformity visible on inspection. Shoulders and hips even. No obvious leg length discrepancy. No scapula winging.    Bending: Full ROM with forward, back and lateral bending. No rib prominence with forward bend.    Cervical:      ROM: WIth neck extension she reaches normal gaze. She is at 30 degrees flexion at neutral.   20 degrees right lateral rotation.      Midline TTP: Negative.     Spurling's test: Negative.     Lhermitte's: Negative.    Thoracic:     Midline TTP: Negative    Lumbar:     Midline TTP: Negative     Straight Leg Test: Negative     Crossed Straight  Leg Test: Negative     Sciatic notch tenderness: Negative.    Other:     SI joint TTP: Negative.     Greater trochanter TTP: Negative.     Tenderness with external/internal hip rotation: Negative.    Diagnostic Results:  All imaging was independently reviewed by me.    MRI C-spine, dated 12/3/2018:  1. C3-C7 laminectomy  2. Severe circumferential compression of spinal cord at C2/3   3. Draping and thinning of the spinal cord throughout the cervical spine along with kyphosis  4. 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 Previous anterior fusion C3 to C5 and autofusion at C5/6  2. 11 mm anterior subluxation of C2 on C3 which is fixed in flexion and extension  3. Severe kyphotic deformity of entire cervical spine with global and sagittal balance measuring 7 cm    ASSESSMENT/PLAN:     Corinne L Bassemier has progressive cervical myelopathy secondary to cord compression at C2-3 from an anterior subluxation, and from cord stretch secondary to fixed cervical kyphosis. The cervical kyphosis is a consequence of a remote cervical laminectomy for resection of an intramedullary ependymoma. I recommend surgical decompression via a C2 laminectomy and instrumented fusion from O-T2. She would also require preop cervical traction, and there is a possibility of an anterior structural ACD at C7-T1 or T1-T2. First, I will send her to Endocrinology to evaluate for osteoporosis and possibly pretreat for 3-6 months with Forteo. They will also consider her Paget's disease in their choice of bone supplementation. Finally I will get a CTA cervical spine to help finalize the surgical plan. She will follow up when these are complete, and in the meantime I think an OTC soft collar is a good idea to help stabilize her symptoms to some degree.    The patient understands and agrees with the plan of care. All questions were answered.     1. CTA Neck  2. Referral to Endocrinology for DXA  bone scan and evaluation for Forteo  3. OTC cervical collar   4. RTC pending #1-2      I, Dr. Yoel Banuelos personally performed the services described in this documentation. All medical record entries made by the scribe, Ez Garcia, were at my direction and in my presence.  I have reviewed the chart and agree that the record reflects my personal performance and is accurate and complete.      Yoel Banuelos M.D.  Department of Neurosurgery  [facility]      .

## 2019-01-14 ENCOUNTER — TELEPHONE (OUTPATIENT)
Dept: NEUROSURGERY | Facility: CLINIC | Age: 74
End: 2019-01-14

## 2019-01-14 NOTE — TELEPHONE ENCOUNTER
DARRICK pt, answered questions regarding upcoming CTA of neck. Pt requests a letter for work excuse for her  on the date of her last appt, 1/10/19, requests letter be sent to their home. Letter in the mail.     ----- Message from Luke De Jesus sent at 1/14/2019 10:42 AM CST -----  Contact: Patient @ [phone]  Patient requesting a return call from [place] she didn't get into details, pls return call

## 2019-01-17 ENCOUNTER — TELEPHONE (OUTPATIENT)
Dept: NEUROSURGERY | Facility: CLINIC | Age: 74
End: 2019-01-17

## 2019-01-17 NOTE — TELEPHONE ENCOUNTER
DARRICK Hanna and Jose Maria at the radiology department, confirmed that Dr. Banuelos wants a CTA of neck, not CT cervical spine. Jose Maria CHIANG       ----- Message from Brenda Bunch RT sent at 1/17/2019 10:13 AM CST -----  This pt. Is on our schedule for tomorrow, the diagnosis does not support the scan, is this ordered incorrectly and should be ct c-spine without? Thanks, jose maria [phone]

## 2019-01-24 ENCOUNTER — TELEPHONE (OUTPATIENT)
Dept: NEUROSURGERY | Facility: CLINIC | Age: 74
End: 2019-01-24

## 2019-01-24 NOTE — TELEPHONE ENCOUNTER
Faxed MRI and XR reports to Dr. Del Rio for review.  Dr. Del Rio has been playing phone tag w/Dr. Banuelos.  According to Dr. Del Rio, the pt may have a malg in her kidney and may need a PET scan so a review of her scans are useful to him.  He will contact us if he needs anything else.  V/U.

## 2019-01-31 ENCOUNTER — TELEPHONE (OUTPATIENT)
Dept: NEUROSURGERY | Facility: CLINIC | Age: 74
End: 2019-01-31

## 2019-01-31 NOTE — TELEPHONE ENCOUNTER
Pt states that the CTA was not completed bc the pt's kidney count is low.  Will see the oncologist next week and will let us know what her biopsy results are. Pt states the  cancer in her lymph nodes may be reoccurring. Pt will need to be rescheduled for a CTA and helio scan.  V/U.  ----- Message from Luke De Jesus sent at 1/31/2019  9:15 AM CST -----  Contact: Jenifer @ [phone]  Patient requesting a return call from May regarding testing, pls call to discuss

## 2019-02-01 DIAGNOSIS — C72.0 MALIGNANT NEOPLASM OF SPINAL CORD: ICD-10-CM

## 2019-02-01 DIAGNOSIS — M96.3 KYPHOSIS, POSTLAMINECTOMY: ICD-10-CM

## 2019-02-01 RX ORDER — TIZANIDINE 2 MG/1
TABLET ORAL
Qty: 60 TABLET | Refills: 0 | Status: SHIPPED | OUTPATIENT
Start: 2019-02-01 | End: 2019-03-13 | Stop reason: SDUPTHER

## 2019-02-13 ENCOUNTER — TELEPHONE (OUTPATIENT)
Dept: NEUROSURGERY | Facility: CLINIC | Age: 74
End: 2019-02-13

## 2019-02-13 NOTE — TELEPHONE ENCOUNTER
Pt has been diagnosed w/cancer and will undergo chemo.  Pt has requested to be removed from our schdule for the foreseeable future while she undergoes trtmt.  Invited her to reach out to us if she would after her trtmt.  V/U. ----- Message from Janette Apple sent at 2/13/2019  1:40 PM CST -----  Contact: self @ [phone]  Would like to speak with someone concerning the tests she will need to have before her surgery.  Pls call.

## 2019-03-13 DIAGNOSIS — M96.3 KYPHOSIS, POSTLAMINECTOMY: ICD-10-CM

## 2019-03-13 DIAGNOSIS — C72.0 MALIGNANT NEOPLASM OF SPINAL CORD: ICD-10-CM

## 2019-03-13 RX ORDER — TIZANIDINE 2 MG/1
2 TABLET ORAL 3 TIMES DAILY
Qty: 60 TABLET | Refills: 1 | Status: SHIPPED | OUTPATIENT
Start: 2019-03-13 | End: 2019-04-09 | Stop reason: SDUPTHER

## 2019-03-13 NOTE — TELEPHONE ENCOUNTER
----- Message from Aston Herrera sent at 3/13/2019  2:01 PM CDT -----  Contact: pt   PT needs a refill on  tiZANidine (ZANAFLEX) 2 MG tabletcalled [facility]    [phone]      Pt can be reached at [phone]

## 2019-04-09 DIAGNOSIS — M96.3 KYPHOSIS, POSTLAMINECTOMY: ICD-10-CM

## 2019-04-09 DIAGNOSIS — C72.0 MALIGNANT NEOPLASM OF SPINAL CORD: ICD-10-CM

## 2019-04-10 RX ORDER — TIZANIDINE 2 MG/1
TABLET ORAL
Qty: 270 TABLET | Refills: 1 | Status: SHIPPED | OUTPATIENT
Start: 2019-04-10

## 2019-05-09 DIAGNOSIS — M96.3 KYPHOSIS, POSTLAMINECTOMY: ICD-10-CM

## 2019-05-09 DIAGNOSIS — C72.0 MALIGNANT NEOPLASM OF SPINAL CORD: ICD-10-CM

## 2019-05-09 RX ORDER — TIZANIDINE 2 MG/1
TABLET ORAL
Qty: 60 TABLET | Refills: 0 | Status: SHIPPED | OUTPATIENT
Start: 2019-05-09 | End: 2019-06-03 | Stop reason: SDUPTHER

## 2019-06-03 DIAGNOSIS — C72.0 MALIGNANT NEOPLASM OF SPINAL CORD: ICD-10-CM

## 2019-06-03 DIAGNOSIS — M96.3 KYPHOSIS, POSTLAMINECTOMY: ICD-10-CM

## 2019-06-04 RX ORDER — TIZANIDINE 2 MG/1
TABLET ORAL
Qty: 60 TABLET | Refills: 0 | Status: SHIPPED | OUTPATIENT
Start: 2019-06-04 | End: 2019-07-05 | Stop reason: SDUPTHER

## 2019-07-05 DIAGNOSIS — C72.0 MALIGNANT NEOPLASM OF SPINAL CORD: ICD-10-CM

## 2019-07-05 DIAGNOSIS — M96.3 KYPHOSIS, POSTLAMINECTOMY: ICD-10-CM

## 2019-07-08 RX ORDER — TIZANIDINE 2 MG/1
TABLET ORAL
Qty: 60 TABLET | Refills: 0 | Status: SHIPPED | OUTPATIENT
Start: 2019-07-08 | End: 2019-08-07 | Stop reason: SDUPTHER

## 2019-08-07 DIAGNOSIS — C72.0 MALIGNANT NEOPLASM OF SPINAL CORD: ICD-10-CM

## 2019-08-07 DIAGNOSIS — M96.3 KYPHOSIS, POSTLAMINECTOMY: ICD-10-CM

## 2019-08-07 RX ORDER — TIZANIDINE 2 MG/1
TABLET ORAL
Qty: 60 TABLET | Refills: 0 | Status: SHIPPED | OUTPATIENT
Start: 2019-08-07 | End: 2019-09-04 | Stop reason: SDUPTHER

## 2019-09-04 DIAGNOSIS — M96.3 KYPHOSIS, POSTLAMINECTOMY: ICD-10-CM

## 2019-09-04 DIAGNOSIS — C72.0 MALIGNANT NEOPLASM OF SPINAL CORD: ICD-10-CM

## 2019-09-04 RX ORDER — TIZANIDINE 2 MG/1
TABLET ORAL
Qty: 60 TABLET | Refills: 0 | Status: SHIPPED | OUTPATIENT
Start: 2019-09-04 | End: 2019-10-10 | Stop reason: SDUPTHER

## 2019-10-10 DIAGNOSIS — M96.3 KYPHOSIS, POSTLAMINECTOMY: ICD-10-CM

## 2019-10-10 DIAGNOSIS — C72.0 MALIGNANT NEOPLASM OF SPINAL CORD: ICD-10-CM

## 2019-10-10 RX ORDER — TIZANIDINE 2 MG/1
TABLET ORAL
Qty: 60 TABLET | Refills: 0 | Status: SHIPPED | OUTPATIENT
Start: 2019-10-10 | End: 2019-11-11 | Stop reason: SDUPTHER

## 2019-11-11 DIAGNOSIS — C72.0 MALIGNANT NEOPLASM OF SPINAL CORD: ICD-10-CM

## 2019-11-11 DIAGNOSIS — M96.3 KYPHOSIS, POSTLAMINECTOMY: ICD-10-CM

## 2019-11-11 RX ORDER — TIZANIDINE 2 MG/1
TABLET ORAL
Qty: 60 TABLET | Refills: 0 | Status: SHIPPED | OUTPATIENT
Start: 2019-11-11 | End: 2019-12-05 | Stop reason: SDUPTHER

## 2019-12-05 DIAGNOSIS — M96.3 KYPHOSIS, POSTLAMINECTOMY: ICD-10-CM

## 2019-12-05 DIAGNOSIS — C72.0 MALIGNANT NEOPLASM OF SPINAL CORD: ICD-10-CM

## 2019-12-05 RX ORDER — TIZANIDINE 2 MG/1
TABLET ORAL
Qty: 60 TABLET | Refills: 0 | Status: SHIPPED | OUTPATIENT
Start: 2019-12-05 | End: 2020-01-02

## 2020-01-02 DIAGNOSIS — C72.0 MALIGNANT NEOPLASM OF SPINAL CORD: ICD-10-CM

## 2020-01-02 DIAGNOSIS — M96.3 KYPHOSIS, POSTLAMINECTOMY: ICD-10-CM

## 2020-01-02 RX ORDER — TIZANIDINE 2 MG/1
TABLET ORAL
Qty: 60 TABLET | Refills: 0 | Status: SHIPPED | OUTPATIENT
Start: 2020-01-02 | End: 2020-01-30

## 2020-01-30 DIAGNOSIS — C72.0 MALIGNANT NEOPLASM OF SPINAL CORD: ICD-10-CM

## 2020-01-30 DIAGNOSIS — M96.3 KYPHOSIS, POSTLAMINECTOMY: ICD-10-CM

## 2020-01-30 RX ORDER — TIZANIDINE 2 MG/1
TABLET ORAL
Qty: 60 TABLET | Refills: 0 | Status: SHIPPED | OUTPATIENT
Start: 2020-01-30 | End: 2020-02-27

## 2020-02-27 DIAGNOSIS — C72.0 MALIGNANT NEOPLASM OF SPINAL CORD: ICD-10-CM

## 2020-02-27 DIAGNOSIS — M96.3 KYPHOSIS, POSTLAMINECTOMY: ICD-10-CM

## 2020-02-27 RX ORDER — TIZANIDINE 2 MG/1
TABLET ORAL
Qty: 60 TABLET | Refills: 0 | Status: SHIPPED | OUTPATIENT
Start: 2020-02-27 | End: 2020-03-23

## 2020-03-19 DIAGNOSIS — M96.3 KYPHOSIS, POSTLAMINECTOMY: ICD-10-CM

## 2020-03-19 DIAGNOSIS — C72.0 MALIGNANT NEOPLASM OF SPINAL CORD: ICD-10-CM

## 2020-03-23 RX ORDER — TIZANIDINE 2 MG/1
TABLET ORAL
Qty: 60 TABLET | Refills: 0 | Status: SHIPPED | OUTPATIENT
Start: 2020-03-23 | End: 2020-04-20

## 2020-04-20 DIAGNOSIS — M96.3 KYPHOSIS, POSTLAMINECTOMY: ICD-10-CM

## 2020-04-20 DIAGNOSIS — C72.0 MALIGNANT NEOPLASM OF SPINAL CORD: ICD-10-CM

## 2020-04-20 RX ORDER — TIZANIDINE 2 MG/1
TABLET ORAL
Qty: 60 TABLET | Refills: 0 | Status: SHIPPED | OUTPATIENT
Start: 2020-04-20 | End: 2020-05-18

## 2020-05-18 DIAGNOSIS — M96.3 KYPHOSIS, POSTLAMINECTOMY: ICD-10-CM

## 2020-05-18 DIAGNOSIS — C72.0 MALIGNANT NEOPLASM OF SPINAL CORD: ICD-10-CM

## 2020-05-18 RX ORDER — TIZANIDINE 2 MG/1
TABLET ORAL
Qty: 60 TABLET | Refills: 0 | Status: SHIPPED | OUTPATIENT
Start: 2020-05-18 | End: 2020-06-16

## 2021-01-04 ENCOUNTER — HOSPITAL ENCOUNTER (EMERGENCY)
Facility: HOSPITAL | Age: 76
Discharge: HOME | End: 2021-01-04
Attending: EMERGENCY MEDICINE
Payer: MEDICARE

## 2021-01-04 VITALS
BODY MASS INDEX: 27.31 KG/M2 | SYSTOLIC BLOOD PRESSURE: 133 MMHG | HEART RATE: 67 BPM | OXYGEN SATURATION: 98 % | DIASTOLIC BLOOD PRESSURE: 75 MMHG | WEIGHT: 160 LBS | TEMPERATURE: 97 F | HEIGHT: 64 IN | RESPIRATION RATE: 18 BRPM

## 2021-01-04 DIAGNOSIS — K08.89 PAIN, DENTAL: ICD-10-CM

## 2021-01-04 DIAGNOSIS — I10 HYPERTENSION: ICD-10-CM

## 2021-01-04 DIAGNOSIS — I10 ASYMPTOMATIC HYPERTENSION: Primary | ICD-10-CM

## 2021-01-04 PROCEDURE — 99283 EMERGENCY DEPT VISIT LOW MDM: CPT | Mod: 25

## 2021-01-04 PROCEDURE — 93010 EKG 12-LEAD: ICD-10-PCS | Mod: ,,, | Performed by: INTERNAL MEDICINE

## 2021-01-04 PROCEDURE — 93005 ELECTROCARDIOGRAM TRACING: CPT

## 2021-01-04 PROCEDURE — 93010 ELECTROCARDIOGRAM REPORT: CPT | Mod: ,,, | Performed by: INTERNAL MEDICINE

## 2021-01-04 RX ORDER — PENICILLIN V POTASSIUM 500 MG/1
500 TABLET, FILM COATED ORAL 4 TIMES DAILY
Qty: 28 TABLET | Refills: 0 | Status: SHIPPED | OUTPATIENT
Start: 2021-01-04 | End: 2021-01-11

## 2021-05-20 ENCOUNTER — HOSPITAL ENCOUNTER (EMERGENCY)
Facility: HOSPITAL | Age: 76
Discharge: HOME | End: 2021-05-20
Attending: EMERGENCY MEDICINE
Payer: MEDICARE

## 2021-05-20 VITALS
WEIGHT: 162.06 LBS | RESPIRATION RATE: 20 BRPM | HEIGHT: 64 IN | HEART RATE: 58 BPM | DIASTOLIC BLOOD PRESSURE: 79 MMHG | SYSTOLIC BLOOD PRESSURE: 151 MMHG | OXYGEN SATURATION: 97 % | TEMPERATURE: 98 F | BODY MASS INDEX: 27.67 KG/M2

## 2021-05-20 DIAGNOSIS — I10 HYPERTENSION, UNSPECIFIED TYPE: Primary | ICD-10-CM

## 2021-05-20 DIAGNOSIS — I10 HTN (HYPERTENSION): ICD-10-CM

## 2021-05-20 PROCEDURE — 99283 EMERGENCY DEPT VISIT LOW MDM: CPT | Mod: 25

## 2021-05-20 PROCEDURE — 25000003 PHARM REV CODE 250: Performed by: NURSE PRACTITIONER

## 2021-05-20 PROCEDURE — 93005 ELECTROCARDIOGRAM TRACING: CPT

## 2021-05-20 RX ORDER — ACETAMINOPHEN 500 MG
1000 TABLET ORAL
Status: COMPLETED | OUTPATIENT
Start: 2021-05-20 | End: 2021-05-20

## 2021-05-20 RX ORDER — CLONIDINE HYDROCHLORIDE 0.1 MG/1
0.1 TABLET ORAL
Status: COMPLETED | OUTPATIENT
Start: 2021-05-20 | End: 2021-05-20

## 2021-05-20 RX ADMIN — CLONIDINE HYDROCHLORIDE 0.1 MG: 0.1 TABLET ORAL at 04:05

## 2021-05-20 RX ADMIN — ACETAMINOPHEN 1000 MG: 500 TABLET ORAL at 04:05
